# Patient Record
Sex: MALE | Race: OTHER | Employment: UNEMPLOYED | URBAN - METROPOLITAN AREA
[De-identification: names, ages, dates, MRNs, and addresses within clinical notes are randomized per-mention and may not be internally consistent; named-entity substitution may affect disease eponyms.]

---

## 2017-01-24 PROCEDURE — 84681 ASSAY OF C-PEPTIDE: CPT | Performed by: INTERNAL MEDICINE

## 2017-01-24 PROCEDURE — 86341 ISLET CELL ANTIBODY: CPT | Performed by: INTERNAL MEDICINE

## 2017-01-24 PROCEDURE — 86337 INSULIN ANTIBODIES: CPT | Performed by: INTERNAL MEDICINE

## 2017-01-24 PROCEDURE — 83516 IMMUNOASSAY NONANTIBODY: CPT | Performed by: INTERNAL MEDICINE

## 2017-08-17 PROBLEM — K92.89 GASTROINTESTINAL DYSMOTILITY: Status: ACTIVE | Noted: 2017-08-17

## 2017-08-28 PROBLEM — M79.89 LEG SWELLING: Status: ACTIVE | Noted: 2017-08-28

## 2017-08-28 PROBLEM — R06.83 SNORING: Status: ACTIVE | Noted: 2017-08-28

## 2017-11-02 ENCOUNTER — HOSPITAL ENCOUNTER (INPATIENT)
Facility: HOSPITAL | Age: 57
LOS: 1 days | Discharge: HOME | DRG: 641 | End: 2017-11-03
Attending: EMERGENCY MEDICINE | Admitting: HOSPITALIST
Payer: COMMERCIAL

## 2017-11-02 ENCOUNTER — APPOINTMENT (OUTPATIENT)
Dept: GENERAL RADIOLOGY | Facility: HOSPITAL | Age: 57
DRG: 641 | End: 2017-11-02
Attending: EMERGENCY MEDICINE
Payer: COMMERCIAL

## 2017-11-02 ENCOUNTER — APPOINTMENT (OUTPATIENT)
Dept: ULTRASOUND IMAGING | Facility: HOSPITAL | Age: 57
DRG: 641 | End: 2017-11-02
Attending: EMERGENCY MEDICINE
Payer: COMMERCIAL

## 2017-11-02 DIAGNOSIS — I10 ESSENTIAL HYPERTENSION: ICD-10-CM

## 2017-11-02 DIAGNOSIS — E13.9 LADA (LATENT AUTOIMMUNE DIABETES IN ADULTS), MANAGED AS TYPE 1 (HCC): ICD-10-CM

## 2017-11-02 DIAGNOSIS — R53.1 WEAKNESS: ICD-10-CM

## 2017-11-02 DIAGNOSIS — Z79.4 LONG TERM CURRENT USE OF INSULIN (HCC): ICD-10-CM

## 2017-11-02 DIAGNOSIS — I95.9 HYPOTENSION, UNSPECIFIED HYPOTENSION TYPE: Primary | ICD-10-CM

## 2017-11-02 DIAGNOSIS — E78.5 HYPERLIPIDEMIA, UNSPECIFIED HYPERLIPIDEMIA TYPE: ICD-10-CM

## 2017-11-02 DIAGNOSIS — R42 DIZZINESS: ICD-10-CM

## 2017-11-02 PROCEDURE — 82746 ASSAY OF FOLIC ACID SERUM: CPT | Performed by: HOSPITALIST

## 2017-11-02 PROCEDURE — 81001 URINALYSIS AUTO W/SCOPE: CPT | Performed by: EMERGENCY MEDICINE

## 2017-11-02 PROCEDURE — 93005 ELECTROCARDIOGRAM TRACING: CPT

## 2017-11-02 PROCEDURE — 86850 RBC ANTIBODY SCREEN: CPT | Performed by: EMERGENCY MEDICINE

## 2017-11-02 PROCEDURE — 83540 ASSAY OF IRON: CPT | Performed by: HOSPITALIST

## 2017-11-02 PROCEDURE — 80076 HEPATIC FUNCTION PANEL: CPT | Performed by: EMERGENCY MEDICINE

## 2017-11-02 PROCEDURE — 82962 GLUCOSE BLOOD TEST: CPT

## 2017-11-02 PROCEDURE — 71010 XR CHEST AP PORTABLE  (CPT=71010): CPT | Performed by: EMERGENCY MEDICINE

## 2017-11-02 PROCEDURE — 87086 URINE CULTURE/COLONY COUNT: CPT | Performed by: EMERGENCY MEDICINE

## 2017-11-02 PROCEDURE — 86901 BLOOD TYPING SEROLOGIC RH(D): CPT | Performed by: EMERGENCY MEDICINE

## 2017-11-02 PROCEDURE — 93010 ELECTROCARDIOGRAM REPORT: CPT | Performed by: EMERGENCY MEDICINE

## 2017-11-02 PROCEDURE — 83036 HEMOGLOBIN GLYCOSYLATED A1C: CPT | Performed by: HOSPITALIST

## 2017-11-02 PROCEDURE — 99285 EMERGENCY DEPT VISIT HI MDM: CPT

## 2017-11-02 PROCEDURE — 76770 US EXAM ABDO BACK WALL COMP: CPT | Performed by: EMERGENCY MEDICINE

## 2017-11-02 PROCEDURE — 85025 COMPLETE CBC W/AUTO DIFF WBC: CPT | Performed by: EMERGENCY MEDICINE

## 2017-11-02 PROCEDURE — 84466 ASSAY OF TRANSFERRIN: CPT | Performed by: HOSPITALIST

## 2017-11-02 PROCEDURE — 84484 ASSAY OF TROPONIN QUANT: CPT | Performed by: EMERGENCY MEDICINE

## 2017-11-02 PROCEDURE — 82607 VITAMIN B-12: CPT | Performed by: HOSPITALIST

## 2017-11-02 PROCEDURE — 86900 BLOOD TYPING SEROLOGIC ABO: CPT | Performed by: EMERGENCY MEDICINE

## 2017-11-02 PROCEDURE — 80048 BASIC METABOLIC PNL TOTAL CA: CPT | Performed by: EMERGENCY MEDICINE

## 2017-11-02 PROCEDURE — 36415 COLL VENOUS BLD VENIPUNCTURE: CPT

## 2017-11-02 PROCEDURE — 83605 ASSAY OF LACTIC ACID: CPT | Performed by: EMERGENCY MEDICINE

## 2017-11-02 RX ORDER — PANTOPRAZOLE SODIUM 20 MG/1
20 TABLET, DELAYED RELEASE ORAL
Status: DISCONTINUED | OUTPATIENT
Start: 2017-11-03 | End: 2017-11-03

## 2017-11-02 RX ORDER — METOCLOPRAMIDE 10 MG/1
10 TABLET ORAL
Status: DISCONTINUED | OUTPATIENT
Start: 2017-11-02 | End: 2017-11-03

## 2017-11-02 RX ORDER — ASPIRIN 81 MG/1
81 TABLET ORAL DAILY
Status: DISCONTINUED | OUTPATIENT
Start: 2017-11-03 | End: 2017-11-03

## 2017-11-02 RX ORDER — DOCUSATE SODIUM 100 MG/1
100 CAPSULE, LIQUID FILLED ORAL 2 TIMES DAILY PRN
Status: DISCONTINUED | OUTPATIENT
Start: 2017-11-02 | End: 2017-11-03

## 2017-11-02 RX ORDER — DEXTROSE MONOHYDRATE 25 G/50ML
50 INJECTION, SOLUTION INTRAVENOUS AS NEEDED
Status: DISCONTINUED | OUTPATIENT
Start: 2017-11-02 | End: 2017-11-03

## 2017-11-02 RX ORDER — LEVOTHYROXINE SODIUM 88 UG/1
88 TABLET ORAL
Status: DISCONTINUED | OUTPATIENT
Start: 2017-11-03 | End: 2017-11-03

## 2017-11-02 RX ORDER — GABAPENTIN 100 MG/1
100 CAPSULE ORAL 3 TIMES DAILY
COMMUNITY
End: 2018-11-16

## 2017-11-02 RX ORDER — INSULIN ASPART 100 [IU]/ML
12 INJECTION, SOLUTION INTRAVENOUS; SUBCUTANEOUS ONCE
Status: COMPLETED | OUTPATIENT
Start: 2017-11-02 | End: 2017-11-02

## 2017-11-02 RX ORDER — GABAPENTIN 100 MG/1
100 CAPSULE ORAL 3 TIMES DAILY
Status: DISCONTINUED | OUTPATIENT
Start: 2017-11-02 | End: 2017-11-03

## 2017-11-02 RX ORDER — ATORVASTATIN CALCIUM 10 MG/1
10 TABLET, FILM COATED ORAL NIGHTLY
Status: DISCONTINUED | OUTPATIENT
Start: 2017-11-02 | End: 2017-11-03

## 2017-11-02 RX ORDER — SODIUM CHLORIDE 0.9 % (FLUSH) 0.9 %
3 SYRINGE (ML) INJECTION AS NEEDED
Status: DISCONTINUED | OUTPATIENT
Start: 2017-11-02 | End: 2017-11-03

## 2017-11-02 RX ORDER — HEPARIN SODIUM 5000 [USP'U]/ML
5000 INJECTION, SOLUTION INTRAVENOUS; SUBCUTANEOUS EVERY 12 HOURS SCHEDULED
Status: DISCONTINUED | OUTPATIENT
Start: 2017-11-02 | End: 2017-11-03

## 2017-11-02 NOTE — ED NOTES
Dr. Zheng Don updated with pt's sbp 70-80's. Orders for IVF received and carried out. HR remains stable. Pt is aox4 with wife at bedside translating for pt- pt understands simple english. Ordered labs collected and sent to lab.  Pt denies and cp but reports so

## 2017-11-02 NOTE — CONSULTS
[facility] HOSP - [facility]    Report of Consultation    Jeffrey Das Sr. Patient Status:  Emergency    1960 MRN [id number]   Location [address] Attending Aminta Melendez MD   Hosp Day # 0 PCP Marilu Belcher MD     Date AT, mucous memb clear, neck supple  Pulm: Lungs clear, normal respiratory effort  CV: Heart with regular rate and rhythm, no edema  Abd: Abdomen soft, nontender, nondistended, no organomegaly, bowel sounds present          Results:     Laboratory Data:  Re

## 2017-11-02 NOTE — ED PROVIDER NOTES
Patient Seen in: [facility] Emergency Department    History   No chief complaint on file. Stated Complaint: Hypotension     HPI    Presents emergency department complaining of weakness, dizziness and was sent because of low blood pressure.   In Eyes: Conjunctivae and EOM are normal.   Neck: Normal range of motion. Neck supple. Cardiovascular: Normal rate and regular rhythm. No murmur heard. Pulmonary/Chest: Effort normal. No respiratory distress. He has rales. Abdominal: Soft.  He exhibi -----------         ------                     CBC W/ DIFFERENTIAL[[id number]]          Abnormal            Final result                 Please view results for these tests on the individual orders. TYPE AND SCREEN    Narrative:      The following orders diagnosis)  Dizziness  Weakness    Disposition:  Admit    Follow-up:  No follow-up provider specified.     Medications Prescribed:  Current Discharge Medication List        Present on Admission  Date Reviewed: 12/14/2016          ICD-10-CM Noted POA    Hypo

## 2017-11-02 NOTE — H&P
[facility] Hospitalist H&P       CC: No chief complaint on file.        PCP: Claus Ennis MD      ASSESSMENT / PLAN:     Mr. Josue Modi is a 63 yo M with PMH of DM2, HTN, CKD, HL, hypothyroidism who presented to pulm office for scheduled visit and complained of fati hypotensive and sent to ER for evaluation. Patient has been feeling more short of breath lately, was on increased dose of lasix but decreased on 10/26 due to elevated Cr. Better with sitting up. Worse with exertion. No fevers/chills.      PMH  Past Medical 3.85 11/02/2017   BUN 47 11/02/2017    11/02/2017   K 4.8 11/02/2017   CL 97 11/02/2017   CO2 24 11/02/2017    11/02/2017   CA 8.4 11/02/2017   ALB 3.0 11/02/2017   ALKPHO 57 11/02/2017   BILT 0.9 11/02/2017   TP 5.9 11/02/2017   AST 17 11/02/

## 2017-11-03 ENCOUNTER — APPOINTMENT (OUTPATIENT)
Dept: CV DIAGNOSTICS | Facility: HOSPITAL | Age: 57
DRG: 641 | End: 2017-11-03
Attending: HOSPITALIST
Payer: COMMERCIAL

## 2017-11-03 VITALS
HEIGHT: 64 IN | OXYGEN SATURATION: 92 % | SYSTOLIC BLOOD PRESSURE: 158 MMHG | HEART RATE: 82 BPM | RESPIRATION RATE: 20 BRPM | BODY MASS INDEX: 26.02 KG/M2 | TEMPERATURE: 98 F | DIASTOLIC BLOOD PRESSURE: 86 MMHG | WEIGHT: 152.38 LBS

## 2017-11-03 PROCEDURE — 85025 COMPLETE CBC W/AUTO DIFF WBC: CPT | Performed by: HOSPITALIST

## 2017-11-03 PROCEDURE — 80048 BASIC METABOLIC PNL TOTAL CA: CPT | Performed by: HOSPITALIST

## 2017-11-03 PROCEDURE — 82962 GLUCOSE BLOOD TEST: CPT

## 2017-11-03 NOTE — DISCHARGE SUMMARY
General Medicine Discharge Summary     Patient ID:  Kassandra Gregory Sr.  62year old  1/9/1960    Admit date: 11/2/2017    Discharge date and time: 11/03/17    Attending Physician: Antonieta Moe MD     Primary Care Physician: Roderick Olivera MD     Reason repeat TTE  - CXR with mild fluid overload  - pulm following  - will check BNP  - hold diuretics     DM2  - home regimen:  - accuchecks QID, hypoglycemic protocol, SSI     Normocytic anemia  - check iron studies, folate, b12  - likely anemia of CKD or anem 3-5 UNITS SUBCUTANEOUSLY, PLUS CORRECTION BASED ON SLIDING SCALE     Insulin Pen Needle 31G X 5 MM Misc  Commonly known as:  BD PEN NEEDLE MINI U/F  Use five times daily with insulin pen     Levothyroxine Sodium 88 MCG Tabs  Commonly known as:  SYNTHROID, lisinopril and verapamil. Continue labetalol.        Follow-up with labs: BMP in 3 days    Total Time Coordinating Care: Greater than 30 minutes    Patient had opportunity to ask questions and state understand and agree with therapeutic plan as outlined

## 2017-11-03 NOTE — PLAN OF CARE
0945 SPO2 CHECKED , 98 % AT REST WITH 1L OXYGEN PER NASAL CANNULA; AT 0950 SPO2 =95% AT REST WITH NO O2 ON;1000, PATIENT WALKED DOWN THE HALLWAY WITHOUT OXYGEN, SPO2 =88%; 1005 PATIENT PLACED ON 1L OXYGEN PER  NASAL CANNULA, SPO2 = 92%. MD RENTERIA.

## 2017-11-03 NOTE — PAYOR COMM NOTE
--------------  ADMISSION REVIEW     Payor: Chong Lupehien #:  [id number]  Authorization Number: [id number]    Admit date: 11/2/17  Admit time: [address]       Admitting Physician: Jacob Kayser, MD  Attending Physician:  Jacob Kayser, MD  Primary vital signs reviewed. All other systems reviewed and negative except as noted above. PSFH elements reviewed from today and agreed except as otherwise stated in HPI.     Physical Exam[MP.1]   ED Triage Vitals [11/02/17 1025]  BP: (!) 71/54  Pulse: 64 Abnormal; Notable for the following:     ALT 14 (*)     Albumin 3.0 (*)     All other components within normal limits   CBC W/ DIFFERENTIAL - Abnormal; Notable for the following:     WBC 11.4 (*)     RBC 2.97 (*)     HGB 8.8 (*)     HCT 26.3 (*)     RDW 15 Normal,     Cardiac Monitor:[MP.1] Pulse Readings from Last 1 Encounters:  11/02/17 : 65[MP.2]  , sinus,      Radiology findings:[MP.1] Xr Chest Ap Portable  (cpt=71010)    Result Date: 11/2/2017  CONCLUSION: Mild interstitial edema.   Bilateral lower lobe overdiuresis, has been on lasix for SOB/fluid overload  - s/p 500 cc bolus in ED with improvement of BP and symptoms  - hold diuretics for now    MICHI on CKD  - Cr 3.85 on admission, baseline has been around 2.2 until recently, on 10/26 Cr 3.7, had diuretic Diabetes [facility])    • Essential hypertension    • Hyperlipidemia    • Thyroid disease         PSH  No past surgical history on file. ALL:    Fish-Derived Produc*    Swelling    Comment: To shrimp  Shellfish-Derived P*    Swelling    Comment:shrimp     [place] 11/02/2017       Recent Labs   Lab  11/02/17   1035   TROP  0.02       Additional Diagnostics: ECG[GV.1] NSR[GV.2]    CXR: image personally reviewed[GV.1] mild fluid overload[GV.2]    Radiology: Xr Chest Ap Portable  (cpt=71010)    Result Date: 11/2/2017 100 UNIT/ML flextouch 14 Units     Date Action Dose Route User    11/2/2017 2026 Given 14 Units Subcutaneous (Right Lower Abdomen) Mamta Amato RN      insulin detemir (LEVEMIR) 100 UNIT/ML flextouch 10 Units     Date Action Dose Route User    11/3/201

## 2017-11-03 NOTE — PROGRESS NOTES
[facility]    Progress Note    [facility] Sr. Patient Status:  Inpatient    1960 MRN [id number]   Location [facility] 1W Attending Echo Torres MD   Hosp Day # 1 PCP Anh Augustin MD       Subjective:   Clarence Fair CO2  24  28          Xr Chest Ap Portable  (cpt=71010)    Result Date: 11/2/2017  CONCLUSION: Mild interstitial edema. Bilateral lower lobe air space opacity suggestive of atelectasis.  Underlying pneumonia is felt to be less likely but is also the diffe

## 2017-11-03 NOTE — PLAN OF CARE
Diabetes/Glucose Control    • Glucose maintained within prescribed range Not Progressing        METABOLIC/FLUID AND ELECTROLYTES - ADULT    • Glucose maintained within prescribed range Not Progressing        Patient/Family Goals    • Patient/Family Long Te

## 2017-11-03 NOTE — PROGRESS NOTES
Pulmonary Progress Note     Assessment / Plan:  1. Hypotension: due to CKD and overdiuresis  - improved with IVFs  - monitor  2. MICHI on CKD: renal ultrasound with no obstruction  - discussed with Dr. Adriana Gardner, appreciate assistance.  Will likely need dialysis

## 2017-11-03 NOTE — CM/SW NOTE
MD order received regarding home O2. ANDREWS met with the pt and his O2 sats were 91% while ambulating. At this time the pt does not qualify for home O2. The pt. And his family are aware and agreeable. Plan is for discharge home today 11/3.     Tyrell Valdes

## 2017-11-06 NOTE — PAYOR COMM NOTE
--------------  DISCHARGE REVIEW    Payor: Rosa Lam #:  [id number]  Authorization Number: [id number]    Admit date: 11/2/17  Admit time:  8947  Discharge Date: 11/3/2017  2:09 PM     Admitting Physician: Violet Rosario MD  Attending Physician times daily and call endocrinologist. Stable for discharge on 11/3/17.      Hypotension- improved  - likely 2/2 dehydration and overdiuresis, has been on lasix for SOB/fluid overload  - s/p 500 cc bolus in ED with improvement of BP and symptoms  - hold diu TABLET BY MOUTH TWICE DAILY  What changed:  See the new instructions.   Notes to patient:  TAKE 1/2 TABLET WHICH IS EQUAL TO 100MG 2 TIMES A DAY INSTEAD OF A WHOLE PILL        CONTINUE taking these medications    aspirin 81 MG Tabs     atorvastatin 10 MG Ta Internal Medicine, PEDIATRICS  Contact information:  [address] [facility] [address] Pema Walker MD In 3 days.     Specialty:  NEPHROLOGY  Contact information:  [place] [id number]  [phone]

## 2017-12-11 PROBLEM — E13.65 DIABETES, TYPE 1.5, UNCONTROLLED, MANAGED AS TYPE 1 (HCC): Status: ACTIVE | Noted: 2017-12-11

## 2018-01-09 PROBLEM — E11.3412 SEVERE NONPROLIFERATIVE DIABETIC RETINOPATHY OF LEFT EYE WITH MACULAR EDEMA ASSOCIATED WITH TYPE 2 DIABETES MELLITUS (HCC): Status: ACTIVE | Noted: 2018-01-09

## 2018-02-07 PROBLEM — I95.9 HYPOTENSION: Status: RESOLVED | Noted: 2017-11-02 | Resolved: 2018-02-07

## 2018-02-07 PROBLEM — R53.1 WEAKNESS: Status: RESOLVED | Noted: 2017-11-02 | Resolved: 2018-02-07

## 2018-02-07 PROBLEM — I95.9 HYPOTENSION, UNSPECIFIED HYPOTENSION TYPE: Status: RESOLVED | Noted: 2017-11-02 | Resolved: 2018-02-07

## 2018-06-21 PROBLEM — R42 DIZZINESS: Status: RESOLVED | Noted: 2017-11-02 | Resolved: 2018-06-21

## 2018-06-21 PROBLEM — M79.89 LEG SWELLING: Status: RESOLVED | Noted: 2017-08-28 | Resolved: 2018-06-21

## 2018-08-08 PROCEDURE — 87329 GIARDIA AG IA: CPT | Performed by: INTERNAL MEDICINE

## 2018-08-08 PROCEDURE — 87209 SMEAR COMPLEX STAIN: CPT | Performed by: INTERNAL MEDICINE

## 2018-08-08 PROCEDURE — 87272 CRYPTOSPORIDIUM AG IF: CPT | Performed by: INTERNAL MEDICINE

## 2018-08-08 PROCEDURE — 87493 C DIFF AMPLIFIED PROBE: CPT | Performed by: INTERNAL MEDICINE

## 2018-08-08 PROCEDURE — 87177 OVA AND PARASITES SMEARS: CPT | Performed by: INTERNAL MEDICINE

## 2020-04-29 PROBLEM — K92.89 GASTROINTESTINAL DYSMOTILITY: Status: RESOLVED | Noted: 2017-08-17 | Resolved: 2020-04-29

## 2021-01-02 ENCOUNTER — HOSPITAL ENCOUNTER (OUTPATIENT)
Age: 61
Discharge: HOME | End: 2021-01-02
Payer: COMMERCIAL

## 2021-01-02 VITALS
OXYGEN SATURATION: 100 % | TEMPERATURE: 97 F | RESPIRATION RATE: 16 BRPM | SYSTOLIC BLOOD PRESSURE: 145 MMHG | HEART RATE: 84 BPM | DIASTOLIC BLOOD PRESSURE: 72 MMHG

## 2021-01-02 DIAGNOSIS — B02.9 HERPES ZOSTER WITHOUT COMPLICATION: Primary | ICD-10-CM

## 2021-01-02 PROCEDURE — 99203 OFFICE O/P NEW LOW 30 MIN: CPT | Performed by: PHYSICIAN ASSISTANT

## 2021-01-02 RX ORDER — VALACYCLOVIR HYDROCHLORIDE 1 G/1
1 TABLET, FILM COATED ORAL 3 TIMES DAILY
Qty: 21 TABLET | Refills: 0 | Status: SHIPPED | OUTPATIENT
Start: 2021-01-02 | End: 2021-01-09

## 2021-01-02 NOTE — ED INITIAL ASSESSMENT (HPI)
Pt states since Tuesday sharp pain in his right arm. Wednesday noticed blistery rash on his arm.  Now allover, pain 8/10

## 2021-01-02 NOTE — ED PROVIDER NOTES
Patient Seen in: [facility]      History   Patient presents with:  Rash    Stated Complaint: shoulder to wrist pain/joints and red blisters    HPI/Subjective:   HPI    CHIEF COMPLAINT: Right upper extremity rash     HISTORY OF PRESENT ILLNESS: Alcohol use: No      Alcohol/week: 0.0 standard drinks    Drug use: No             Review of Systems    Positive for stated complaint: shoulder to wrist pain/joints and red blisters  Other systems are as noted in HPI.   Constitutional and vital signs revi This 80-year-old male presents for for evaluation of a vesicular rash on the right upper extremity. Rash appears consistent with shingles. Patient will be placed on Valtrex. He can continue Tylenol and Lyrica as previously prescribed.   Follow-up with pr

## 2023-04-26 NOTE — CM/SW NOTE
Contacted [facility] for possible transfer. They have no bed availability at this time . Notified Dr. Keith Spivey, Dr. Hari Harrison and patient. Face sheet faxed to [facility]. They will contact us if they have available beds. Patient to be admitted to [address] [place] [address]. Attending Attestation (For Attendings USE Only)...